# Patient Record
Sex: MALE | Race: WHITE | Employment: FULL TIME | URBAN - METROPOLITAN AREA
[De-identification: names, ages, dates, MRNs, and addresses within clinical notes are randomized per-mention and may not be internally consistent; named-entity substitution may affect disease eponyms.]

---

## 2021-05-21 ENCOUNTER — HOSPITAL ENCOUNTER (EMERGENCY)
Age: 26
Discharge: HOME | End: 2021-05-21
Payer: COMMERCIAL

## 2021-05-21 VITALS
DIASTOLIC BLOOD PRESSURE: 112 MMHG | TEMPERATURE: 98.1 F | RESPIRATION RATE: 18 BRPM | SYSTOLIC BLOOD PRESSURE: 168 MMHG | HEART RATE: 73 BPM | OXYGEN SATURATION: 99 %

## 2021-05-21 DIAGNOSIS — R68.84 JAW PAIN: Primary | ICD-10-CM

## 2021-05-21 DIAGNOSIS — K08.89 PAIN, DENTAL: ICD-10-CM

## 2021-05-21 PROCEDURE — 96372 THER/PROPH/DIAG INJ SC/IM: CPT

## 2021-05-21 PROCEDURE — 6360000002 HC RX W HCPCS: Performed by: PHYSICIAN ASSISTANT

## 2021-05-21 PROCEDURE — 64400 NJX AA&/STRD TRIGEMINAL NRV: CPT

## 2021-05-21 PROCEDURE — 99282 EMERGENCY DEPT VISIT SF MDM: CPT

## 2021-05-21 RX ORDER — NAPROXEN 500 MG/1
500 TABLET ORAL 2 TIMES DAILY PRN
Qty: 30 TABLET | Refills: 0 | Status: SHIPPED | OUTPATIENT
Start: 2021-05-21

## 2021-05-21 RX ORDER — KETOROLAC TROMETHAMINE 30 MG/ML
15 INJECTION, SOLUTION INTRAMUSCULAR; INTRAVENOUS ONCE
Status: COMPLETED | OUTPATIENT
Start: 2021-05-21 | End: 2021-05-21

## 2021-05-21 RX ORDER — CHLORHEXIDINE GLUCONATE 0.12 MG/ML
15 RINSE ORAL 2 TIMES DAILY
Qty: 420 ML | Refills: 0 | Status: SHIPPED | OUTPATIENT
Start: 2021-05-21 | End: 2021-06-04

## 2021-05-21 RX ADMIN — KETOROLAC TROMETHAMINE 15 MG: 30 INJECTION, SOLUTION INTRAMUSCULAR at 01:49

## 2021-05-21 ASSESSMENT — PAIN SCALES - GENERAL
PAINLEVEL_OUTOF10: 8
PAINLEVEL_OUTOF10: 10

## 2021-05-21 ASSESSMENT — PAIN DESCRIPTION - PAIN TYPE: TYPE: ACUTE PAIN

## 2021-05-21 NOTE — ED NOTES
Pt to ED for severe right lower dental pain. Pt states he needs a root canal done but is having trouble getting it done as his dentist is on vacation. Pt states he was prescribed an antibiotic which he has completed. Pt states he has been having severe pain for the past 2 nights.      Amelia Velez RN  05/21/21 [id number]

## 2021-05-22 ENCOUNTER — HOSPITAL ENCOUNTER (EMERGENCY)
Age: 26
Discharge: HOME | End: 2021-05-22
Attending: EMERGENCY MEDICINE
Payer: COMMERCIAL

## 2021-05-22 VITALS
HEIGHT: 69 IN | BODY MASS INDEX: 41.47 KG/M2 | OXYGEN SATURATION: 96 % | HEART RATE: 80 BPM | SYSTOLIC BLOOD PRESSURE: 145 MMHG | RESPIRATION RATE: 20 BRPM | TEMPERATURE: 98.8 F | DIASTOLIC BLOOD PRESSURE: 98 MMHG | WEIGHT: 280 LBS

## 2021-05-22 DIAGNOSIS — K08.89 PAIN, DENTAL: Primary | ICD-10-CM

## 2021-05-22 DIAGNOSIS — R68.84 JAW PAIN: ICD-10-CM

## 2021-05-22 PROCEDURE — 99283 EMERGENCY DEPT VISIT LOW MDM: CPT

## 2021-05-22 PROCEDURE — 6360000002 HC RX W HCPCS: Performed by: PHYSICIAN ASSISTANT

## 2021-05-22 PROCEDURE — 96372 THER/PROPH/DIAG INJ SC/IM: CPT

## 2021-05-22 RX ORDER — HYDROCODONE BITARTRATE AND ACETAMINOPHEN 5; 325 MG/1; MG/1
1 TABLET ORAL EVERY 6 HOURS PRN
Qty: 10 TABLET | Refills: 0 | Status: SHIPPED | OUTPATIENT
Start: 2021-05-22 | End: 2021-05-25

## 2021-05-22 RX ORDER — KETOROLAC TROMETHAMINE 30 MG/ML
15 INJECTION, SOLUTION INTRAMUSCULAR; INTRAVENOUS ONCE
Status: COMPLETED | OUTPATIENT
Start: 2021-05-22 | End: 2021-05-22

## 2021-05-22 RX ADMIN — KETOROLAC TROMETHAMINE 15 MG: 30 INJECTION, SOLUTION INTRAMUSCULAR at 02:18

## 2021-05-22 ASSESSMENT — ENCOUNTER SYMPTOMS
RHINORRHEA: 0
BACK PAIN: 0
SORE THROAT: 0
NAUSEA: 0
VOMITING: 0
ABDOMINAL PAIN: 0
COUGH: 0
VOICE CHANGE: 0
DIARRHEA: 0
FACIAL SWELLING: 0
SHORTNESS OF BREATH: 0
TROUBLE SWALLOWING: 0
STRIDOR: 0

## 2021-05-22 ASSESSMENT — PAIN DESCRIPTION - DESCRIPTORS: DESCRIPTORS: THROBBING

## 2021-05-22 ASSESSMENT — PAIN SCALES - GENERAL: PAINLEVEL_OUTOF10: 10

## 2021-05-22 NOTE — ED NOTES
Patient states he was here last night for same symptoms. He needs a root canal and his dentist is out of town until next week. He was given peridex solution and naproxen, but states nothing is helping.       Jovana Arroyo RN  05/22/21 0157

## 2021-05-22 NOTE — ED PROVIDER NOTES
[address] [facility] ENCOUNTER          PHYSICIAN ASSISTANT NOTE       Date of evaluation: 5/22/2021    Chief Complaint     Dental Pain      History of Present Illness     Hussain Farmer is a 27 y.o. male who presents with complaints of right lower molar tooth pain that radiates into his jaw and right-side of his head. Patient denies any injury or trauma to the jaw or tooth. Patient states he was seen here last night for same symptoms. He states they did try a dental block but it did not help. He said he was also given Toradol with some relief of pain. He was prescribed Naprosyn and Peridex mouthwash which he has been using without relief of symptoms. He denies any fevers or chills or facial swelling. Denies any submandibular swelling or trismus. Denies any drooling or voice change. He states the pain is a sharp pain to his tooth. He denies pain to the face but states the pain from the jaw it radiates up until his right scalp and head. He denies any injury or trauma to the head. Denies any syncopal episode. Denies any nausea, vomiting or diarrhea. Denies any vision changes. Denies any chest pain shortness of breath or cough. She complains of throbbing aching pain to the right side of his head and face and jaw. He denies any neck pain or stiffness. Denies any hearing changes or drainage or bleeding from ear. Denies any sore throat or congestion or cough. Review of Systems     Review of Systems   Constitutional: Negative for chills, fatigue and fever. HENT: Positive for dental problem. Negative for congestion, drooling, facial swelling, mouth sores, postnasal drip, rhinorrhea, sore throat, trouble swallowing and voice change. Eyes: Negative for visual disturbance. Respiratory: Negative for cough, shortness of breath and stridor. Cardiovascular: Negative for chest pain and palpitations.    Gastrointestinal: Negative for abdominal pain, diarrhea, nausea and vomiting. Genitourinary: Negative for flank pain. Musculoskeletal: Negative for back pain, myalgias, neck pain and neck stiffness. Neurological: Positive for headaches. Negative for dizziness, syncope, weakness, light-headedness and numbness. Past Medical, Surgical, Family, and Social History     He has no past medical history on file. He has no past surgical history on file. His family history is not on file. He reports that he has never smoked. He has never used smokeless tobacco. He reports that he does not drink alcohol and does not use drugs. Medications     Previous Medications    CHLORHEXIDINE (PERIDEX) 0.12 % SOLUTION    Take 15 mLs by mouth 2 times daily for 14 days    NAPROXEN (NAPROSYN) 500 MG TABLET    Take 1 tablet by mouth 2 times daily as needed for Pain       Allergies     He has No Known Allergies. Physical Exam     INITIAL VITALS: BP: (!) 145/98, Temp: 98.8 °F (37.1 °C), Pulse: 80, Resp: 20, SpO2: 96 %  Physical Exam  Vitals and nursing note reviewed. Constitutional:       General: He is not in acute distress. Appearance: Normal appearance. He is not ill-appearing. HENT:      Head: Normocephalic and atraumatic. Jaw: There is normal jaw occlusion. No trismus, tenderness, swelling, pain on movement or malocclusion. Right Ear: Hearing, tympanic membrane, ear canal and external ear normal. No tenderness. No mastoid tenderness. Tympanic membrane is not erythematous. Left Ear: Hearing, ear canal and external ear normal. No tenderness. No mastoid tenderness. Tympanic membrane is not erythematous. Nose: Nose normal. No congestion or rhinorrhea. Right Sinus: No maxillary sinus tenderness or frontal sinus tenderness. Left Sinus: No maxillary sinus tenderness or frontal sinus tenderness. Mouth/Throat:      Mouth: Mucous membranes are moist.      Dentition: Abnormal dentition. Does not have dentures. Dental tenderness and dental caries present.  No gingival swelling, dental abscesses or gum lesions. Pharynx: Oropharynx is clear. Uvula midline. No pharyngeal swelling, oropharyngeal exudate, posterior oropharyngeal erythema or uvula swelling. Tonsils: No tonsillar exudate. Eyes:      Conjunctiva/sclera: Conjunctivae normal.      Pupils: Pupils are equal, round, and reactive to light. Cardiovascular:      Rate and Rhythm: Normal rate and regular rhythm. Pulses: Normal pulses. Heart sounds: Normal heart sounds. Pulmonary:      Effort: Pulmonary effort is normal.      Breath sounds: Normal breath sounds. Chest:      Chest wall: No tenderness. Abdominal:      Palpations: Abdomen is soft. Tenderness: There is no abdominal tenderness. Musculoskeletal:         General: Normal range of motion. Cervical back: Full passive range of motion without pain, normal range of motion and neck supple. No pain with movement, spinous process tenderness or muscular tenderness. Normal range of motion. Skin:     General: Skin is warm and dry. Neurological:      General: No focal deficit present. Mental Status: He is alert and oriented to person, place, and time. Cranial Nerves: No cranial nerve deficit. Sensory: No sensory deficit. Motor: No weakness. Diagnostic Results       RADIOLOGY:  No orders to display       LABS:   No results found for this visit on 05/22/21. RECENT VITALS:  BP: (!) 145/98, Temp: 98.8 °F (37.1 °C), Pulse: 80, Resp: 20, SpO2: 96 %     Procedures       ED Course     Nursing Notes, Past Medical Hx,Past Surgical Hx, Social Hx, Allergies, and Family Hx were reviewed. The patient was given the following medications:  Orders Placed This Encounter   Medications    ketorolac (TORADOL) injection 15 mg    HYDROcodone-acetaminophen (NORCO) 5-325 MG per tablet     Sig: Take 1 tablet by mouth every 6 hours as needed for Pain for up to 3 days. Intended supply: 3 days.  Take lowest dose possible to manage pain     Dispense:  10 tablet     Refill:  0       CONSULTS:  None    MEDICAL DECISION MAKING / ASSESSMENT / Aimee Summer is a 27 y.o. male patient presents here with complaints of right lower molar tooth pain that radiates into his head causing headache. Patient has normal neurological exam.  Headache seems to be caused by tooth pain. He has no obvious facial swelling or tooth abscess or trismus. He has a dentist but the dentist is out of town until Monday. Patient has completed 2-3 courses of antibiotics in the last 1 to 2 months without relief of symptoms. He was seen last night and given Toradol with some relief. He declines a dental block today. He was given another dose of Toradol today. He is driving home so no further pain medication will be given. He was prescribed a few Norco for pain. He can continue Naprosyn and Peridex mouthwash. Follow-up with dentist.  If fevers, facial swelling, trismus, headache or worse return emergency department. Patient stable for discharge. Patient for follow-up with PCP for blood pressure recheck. Clinical Impression     1. Pain, dental    2. Jaw pain        Disposition     PATIENT REFERRED TO:  [facility] Emergency Department  [address]  [phone]    If symptoms worsen    your dentist    Schedule an appointment as soon as possible for a visit in 2 days        DISCHARGE MEDICATIONS:  New Prescriptions    HYDROCODONE-ACETAMINOPHEN (NORCO) 5-325 MG PER TABLET    Take 1 tablet by mouth every 6 hours as needed for Pain for up to 3 days. Intended supply: 3 days.  Take lowest dose possible to manage pain       DISPOSITION  discharge        Katherine Rojasma  05/22/21 [id number]

## 2021-06-10 ASSESSMENT — ENCOUNTER SYMPTOMS
NAUSEA: 0
WHEEZING: 0
VOMITING: 0
TROUBLE SWALLOWING: 0
FACIAL SWELLING: 0
ABDOMINAL PAIN: 0
ABDOMINAL DISTENTION: 0
COUGH: 0
RHINORRHEA: 0
SORE THROAT: 0
DIARRHEA: 0
CHEST TIGHTNESS: 0
COLOR CHANGE: 0
EYE PAIN: 0
SHORTNESS OF BREATH: 0

## 2021-06-10 NOTE — ED PROVIDER NOTES
[address] [facility]          PHYSICIAN ASSISTANT NOTE       Date of evaluation: 5/21/2021    Chief Complaint     Dental Pain (RIGHT LOWER)      History of Present Illness     Meryl Nixon is a 27 y.o. male who presents to the Emergency Department with a complaint of right sided jaw and tooth pain. The pain has been going on for the past couple of days, noted worse at night. The patient reports that he has had issues with a tooth of the right lower jaw for quite some time, but has been having trouble getting any dental care performed as his dentist is on vacation. He was prescribed an antibiotic which he has taken and completed, but after completion of the antibiotic, the patient reports that his symptoms have returned. He was told by his dentist that he would likely need a root canal. The pain is worsened by chewing on the affected side, hot/cold extremes of food, palpation and inhalation of cold air through his mouth. .  Patient denies any significant facial swelling. Pt denies any fever, chills, sweats or other constitutional symptoms. Pt denies any difficulty swallowing or breathing. Pt controls his own secretions. No trismus. The patient has not had a COVID-19 vaccine. No known exposures to patients with COVID-19 or under investigation for the disease. Review of Systems     Review of Systems   Constitutional: Negative for chills, diaphoresis, fatigue and fever. HENT: Positive for dental problem. Negative for congestion, facial swelling, postnasal drip, rhinorrhea, sore throat and trouble swallowing. Right sided jaw pain   Eyes: Negative for pain and visual disturbance. Respiratory: Negative for cough, chest tightness, shortness of breath and wheezing. Cardiovascular: Negative for chest pain, palpitations and leg swelling. Gastrointestinal: Negative for abdominal distention, abdominal pain, diarrhea, nausea and vomiting.    Endocrine: Negative for General: Normal range of motion. Cervical back: Normal range of motion and neck supple. Skin:     General: Skin is warm and dry. Coloration: Skin is not pale. Findings: No erythema or rash. Neurological:      Mental Status: He is alert and oriented to person, place, and time. Coordination: Coordination normal.           Diagnostic Results     RADIOLOGY:  No orders to display       LABS:   No results found for this visit on 05/21/21. ED BEDSIDE ULTRASOUND:  N/A    RECENT VITALS:  BP: (!) 168/112, Temp: 98.1 °F (36.7 °C), Pulse: 73, Resp: 18, SpO2: 99 %     Procedures     Dental Nerve Block  Performed by: AVANI Lucio  Authorized by: AVANI Lucio     Consent:     Consent obtained:  Verbal    Consent given by:  Patient    Risks discussed: Allergic reaction, infection, nerve damage, intravascular injection, pain, swelling, unsuccessful block and hematoma    Alternatives discussed:  No treatment and alternative treatment  Indications:     Indications: dental pain    Location:     Block type: Inferior alveolar    Laterality:  Right  Procedure details (see MAR for exact dosages): Topical anesthetic:  Benzocaine gel    Needle gauge:  25 G    Anesthetic injected:  Bupivacaine 0.5% WITH epi    Injection procedure:  Anatomic landmarks identified, introduced needle, incremental injection, anatomic landmarks palpated and negative aspiration for blood  Post-procedure details:     Outcome:  Anesthesia achieved    Patient tolerance of procedure: Tolerated well, no immediate complications        ED Course     Nursing Notes, Past Medical Hx,Past Surgical Hx, Social Hx, Allergies, and Family Hx were reviewed.     The patient was given the following medications:  Orders Placed This Encounter   Medications    naproxen (NAPROSYN) 500 MG tablet     Sig: Take 1 tablet by mouth 2 times daily as needed for Pain     Dispense:  30 tablet     Refill:  0    chlorhexidine (PERIDEX) 0.12 % solution Sig: Take 15 mLs by mouth 2 times daily for 14 days     Dispense:  420 mL     Refill:  0    ketorolac (TORADOL) injection 15 mg       CONSULTS:  None    MEDICAL DECISION MAKING / ASSESSMENT / Cornelius Her is admitted to the Emergency Department for evaluation of his chief complaint as described in the history of present illness. Complete history and physical was performed by me and my attending. Nursing notes, past medical history, surgical history, family history and social history were reviewed and addressed in the HPI. Sarwat Medina is a 27 y.o. male who presents to the emergency department with a complaint of symptoms of tooth pain. Benign exam with no fevers or facial swelling noted. Patient has no adenopathy or systemic symptoms. The patient appears to have a right lower molar demonstrating signs of pericoronitis without significant periodontal abscess. No buccal space swelling. No surgical intervention is needed at this time. An inferior alveolar dental block was performed in the emergency department with alleviation of his symptoms. He can be treated as an outpatient with pain medication. He just completed a course of antibiotics and should hopefully have follow-up with his dentist within the next 2 days. Given there is no profound signs of infection at this time, I do not feel that starting the patient on a second antibiotic therapy is warranted. Patient was given initial doses of naproxen in the emergency department. I will prescribe the patient Peridex and naproxen. Patient instructed to follow up with dental clinic. Patient can return if fevers, facial swelling or worsening symptoms. I discussed this plan at length with the patient who verbalizes understanding and is in agreement. The patient is currently stable and will be discharged home for continued self-care. Please see the patient's AVS for additional discharge instructions.     The patient and/or the family were informed of the results of any tests, and time was given to answer questions, a plan was proposed and they agreed with plan. Clinical Impression     1. Jaw pain    2.  Pain, dental        Disposition     PATIENT REFERRED TO:  Your dentist    Schedule an appointment as soon as possible for a visit       [facility] Emergency Department    Go to   If symptoms worsen      DISCHARGE MEDICATIONS:  Discharge Medication List as of 5/21/2021  1:13 AM      START taking these medications    Details   naproxen (NAPROSYN) 500 MG tablet Take 1 tablet by mouth 2 times daily as needed for Pain, Disp-30 tablet, R-0Print      chlorhexidine (PERIDEX) 0.12 % solution Take 15 mLs by mouth 2 times daily for 14 days, Disp-420 mL, R-0Print             DISPOSITION Decision To Discharge 05/21/2021 01:09:36 AM       [address] [place] [address] [place]  06/10/21 8963